# Patient Record
Sex: MALE | Race: WHITE | URBAN - NONMETROPOLITAN AREA
[De-identification: names, ages, dates, MRNs, and addresses within clinical notes are randomized per-mention and may not be internally consistent; named-entity substitution may affect disease eponyms.]

---

## 2019-08-12 ENCOUNTER — OFFICE VISIT (OUTPATIENT)
Dept: FAMILY MEDICINE | Facility: OTHER | Age: 19
End: 2019-08-12
Attending: NURSE PRACTITIONER
Payer: OTHER MISCELLANEOUS

## 2019-08-12 VITALS
TEMPERATURE: 99.4 F | OXYGEN SATURATION: 97 % | HEART RATE: 73 BPM | HEIGHT: 66 IN | RESPIRATION RATE: 16 BRPM | SYSTOLIC BLOOD PRESSURE: 138 MMHG | DIASTOLIC BLOOD PRESSURE: 82 MMHG | WEIGHT: 216.5 LBS | BODY MASS INDEX: 34.79 KG/M2

## 2019-08-12 DIAGNOSIS — S81.011A KNEE LACERATION, RIGHT, INITIAL ENCOUNTER: Primary | ICD-10-CM

## 2019-08-12 PROCEDURE — 90471 IMMUNIZATION ADMIN: CPT | Performed by: NURSE PRACTITIONER

## 2019-08-12 PROCEDURE — 99203 OFFICE O/P NEW LOW 30 MIN: CPT | Mod: 25 | Performed by: NURSE PRACTITIONER

## 2019-08-12 PROCEDURE — 90715 TDAP VACCINE 7 YRS/> IM: CPT | Performed by: NURSE PRACTITIONER

## 2019-08-12 PROCEDURE — 12001 RPR S/N/AX/GEN/TRNK 2.5CM/<: CPT | Performed by: NURSE PRACTITIONER

## 2019-08-12 ASSESSMENT — ENCOUNTER SYMPTOMS
CONSTITUTIONAL NEGATIVE: 1
ARTHRALGIAS: 0
WOUND: 1
NEUROLOGICAL NEGATIVE: 1
JOINT SWELLING: 0
PSYCHIATRIC NEGATIVE: 1
COLOR CHANGE: 0

## 2019-08-12 ASSESSMENT — MIFFLIN-ST. JEOR: SCORE: 1934.79

## 2019-08-12 ASSESSMENT — PAIN SCALES - GENERAL: PAINLEVEL: EXTREME PAIN (8)

## 2019-08-12 NOTE — PROGRESS NOTES
"  SUBJECTIVE:   Jose Dillon is a 19 year old male who presents to clinic today for the following health issues:    HPI  Cut to his right knee which occurred today. Was cutting cardboard at work with his utility knife and the knife slipped hitting his knee. Bleeding controlled, AFROM in the knee. Bandaged and came in for evaluation.        Past medical, surgical, family and social history are reviewed and updated as needed.  Medications are reviewed and updated as needed.      Review of Systems   Constitutional: Negative.    Musculoskeletal: Negative for arthralgias and joint swelling.   Skin: Positive for wound. Negative for color change.   Neurological: Negative.    Psychiatric/Behavioral: Negative.         OBJECTIVE:     /82 (BP Location: Right arm, Patient Position: Sitting, Cuff Size: Adult Regular)   Pulse 73   Temp 99.4  F (37.4  C) (Tympanic)   Resp 16   Ht 1.676 m (5' 6\")   Wt 98.2 kg (216 lb 8 oz)   SpO2 97%   BMI 34.94 kg/m    Body mass index is 34.94 kg/m .  Physical Exam   Constitutional: He is oriented to person, place, and time. He appears well-developed and well-nourished. No distress.   HENT:   Head: Normocephalic and atraumatic.   Right Ear: External ear normal.   Left Ear: External ear normal.   Nose: Nose normal.   Eyes: Conjunctivae are normal. Right eye exhibits no discharge. Left eye exhibits no discharge. No scleral icterus.   Neck: Normal range of motion.   Cardiovascular: Normal rate.   Pulmonary/Chest: Effort normal.   Musculoskeletal: Normal range of motion.   Neurological: He is alert and oriented to person, place, and time.   Skin: Skin is warm. He is not diaphoretic.        Psychiatric: He has a normal mood and affect. His behavior is normal. Judgment and thought content normal.   Nursing note and vitals reviewed.      [unfilled]    Procedure: Laceration repair  Date/Time: 8/12/2019 1:20 PM  Performed by: Alice Ozuna NP  Authorized by: Laith, " Alice AKHTAR NP     UNIVERSAL PROTOCOL   Site Marked: Yes  Prior Images Obtained and Reviewed:  Yes  Required items: Required blood products, implants, devices and special equipment available    Patient identity confirmed:  Verbally with patient  NA - No sedation, light sedation, or local anesthesia  Confirmation Checklist:  Relevant allergies  Time out: Immediately prior to the procedure a time out was called    Preparation: Patient was prepped and draped in usual sterile fashion       ANESTHESIA    Anesthesia: Local infiltration  Local Anesthetic:  Lidocaine 1% with epinephrine  Anesthetic Total (mL):  1.5      SEDATION    Patient Sedated: No    PROCEDURE   Patient Tolerance:  Patient tolerated the procedure well with no immediate complications  Describe Procedure: Laceration Repair  Indication: Reduce risk of infection, reduce bleeding risk.   Location: Anterior right knee, 1.5 cm in length  Verbal consent was obtained before procedure started.    PROCEDURE:  The appropriate timeout was taken. The area was prepped and draped in the usual sterile fashion.   Local anesthesia was achieved using 2 mLs of  Lidocaine 1% with epinephrine. The wound was copiously irrigated.   Using 3-0 Nylon, 4 sutures were placed. Estimated blood loss was less than 0.5 mL.   A dressing was applied to the area and anticipatory guidance, as well as standard post-procedure care, was explained.     Time of Sedation in Minutes by Physician:  0    Diagnostic Test Results:  No results found for this or any previous visit (from the past 24 hour(s)).    ASSESSMENT/PLAN:       ICD-10-CM    1. Knee laceration, right, initial encounter S81.011A       PLAN:  Patient presents after injuring his right knee while at work.  Due to risk of infection, laceration is repaired.  Wound is bandaged after repair and ace wrap was put over his knee to prevent overuse.  Discussed wound care at home.  Advised no strenuous activity, no squatting and no heavy lifting  to avoid reopening the wound.  Advised no swimming or soaking his leg. Will take him off work today. Monitor symptoms and follow-up as scheduled tomorrow for reevaluation. Advised patient he should expect to be released back to work with restrictions.  Sutures should come out in 10 to 14 days, follow-up sooner if concerns develop.  Given written epic education materials.  I explained my diagnostic considerations and recommendations to pt who voiced understanding and agreement with the treatment plan. All questions were answered. We discussed potential side effects of any prescribed or recommended therapies, as well as expectations for response to treatments.    Disclaimer:  This note consists of words and symbols derived from keyboarding, dictation, or using voice recognition software. As a result, there may be errors in the script that have gone undetected. Please consider this when interpreting information found in this note.      EVERETT Carter, NP-C  8/12/2019 at 12:52 PM  [facility]

## 2019-08-12 NOTE — PATIENT INSTRUCTIONS
No heavy lifting, no straining your knee, limited bend only - no squat motions until wound is healed.       Keep clean and dry x 24 hours, then may shower as usual. Wash gently with soap and warm water then pat dry.  Do not soak or swim - no pools, no lakes, no hot tubs, no washing dishes.  Take Tylenol per package directions as needed for pain.  Follow up tomorrow as scheduled.   Watch for signs and symptoms of infection: Increasing redness, pain, warmth, fever and follow up if any arise.    Patient Education     Extremity Laceration: Stitches, Staples, or Tape  A laceration is a cut through the skin. If it is deep, it may require stitches or staples to close so it can heal. Minor cuts may be treated with surgical tape closures, or skin glue.  X-rays may be done if something may have entered the skin through the cut. You may also need a tetanus shot if you are not up to date on this vaccine.  Home care    Follow the healthcare provider s instructions on how to care for the cut.    Wash your hands with soap and warm water before and after caring for your wound. This is to help prevent infection.    Keep the wound clean and dry. If a bandage was applied and it becomes wet or dirty, replace it. Otherwise, leave it in place for the first 24 hours, then change it once a day or as directed.    If stitches or staples were used, clean the wound daily:  ? After removing the bandage, wash the area with soap and water. Use a wet cotton swab to loosen and remove any blood or crust that forms.  ? After cleaning, keep the wound clean and dry. Talk with your healthcare provider before putting any antibiotic ointment on the wound. Reapply the bandage.    You may remove the bandage to shower as usual after the first 24 hours, but don't soak the area in water (no swimming) until the stitches or staples are removed.    Don't do activities that may reopen your wound.  Follow-up care  Follow up with your healthcare provider, or as  advised. Infection may sometimes occur even with proper treatment. Check the wound daily for the signs of infection listed below.    When to seek medical advice  Call your healthcare provider right away if any of these occur:    Wound bleeding not controlled by direct pressure    Signs of infection, including increasing pain in the wound, increasing wound redness or swelling, or pus or bad odor coming from the wound    Fever of 100.4 F (38 C) or higher, or as directed by your healthcare provider    Stitches or staples come apart or fall out or surgical tape falls off before 7 days    Wound edges reopen    Wound changes colors    Numbness occurs around the wound     Decreased movement around the injured area

## 2019-08-12 NOTE — NURSING NOTE
"Chief Complaint   Patient presents with     Work Comp     Pt states he was cutting cardboard at work when the knife slipped and he cut his right knee.       Initial /82 (BP Location: Right arm, Patient Position: Sitting, Cuff Size: Adult Regular)   Pulse 73   Temp 99.4  F (37.4  C) (Tympanic)   Resp 16   Ht 1.676 m (5' 6\")   Wt 98.2 kg (216 lb 8 oz)   SpO2 97%   BMI 34.94 kg/m   Estimated body mass index is 34.94 kg/m  as calculated from the following:    Height as of this encounter: 1.676 m (5' 6\").    Weight as of this encounter: 98.2 kg (216 lb 8 oz).  Medication Reconciliation: complete    Jhoana Capone LPN on 8/12/2019 at 12:41 PM    "

## 2019-08-13 ENCOUNTER — OFFICE VISIT (OUTPATIENT)
Dept: FAMILY MEDICINE | Facility: OTHER | Age: 19
End: 2019-08-13
Attending: CHIROPRACTOR
Payer: OTHER MISCELLANEOUS

## 2019-08-13 VITALS
RESPIRATION RATE: 16 BRPM | HEIGHT: 66 IN | TEMPERATURE: 98.3 F | HEART RATE: 68 BPM | SYSTOLIC BLOOD PRESSURE: 126 MMHG | DIASTOLIC BLOOD PRESSURE: 60 MMHG | BODY MASS INDEX: 35.2 KG/M2 | WEIGHT: 219 LBS

## 2019-08-13 DIAGNOSIS — S81.011A KNEE LACERATION, RIGHT, INITIAL ENCOUNTER: Primary | ICD-10-CM

## 2019-08-13 PROCEDURE — 99213 OFFICE O/P EST LOW 20 MIN: CPT | Performed by: CHIROPRACTOR

## 2019-08-13 ASSESSMENT — PAIN SCALES - GENERAL: PAINLEVEL: MILD PAIN (3)

## 2019-08-13 ASSESSMENT — MIFFLIN-ST. JEOR: SCORE: 1946.13

## 2019-08-13 NOTE — NURSING NOTE
Patient presenting for follow up from the [facility] for the following work comp concerns: Laceration on the right knee with 3 sutures.  DATE OF INJURY: 8/12/19.  No LMP for male patient.  Medication Reconciliation: complete     Review Of Systems  Skin: positive for as above  Eyes: negative  Ears/Nose/Throat: negative  Respiratory: No shortness of breath, dyspnea on exertion, cough, or hemoptysis  Cardiovascular: negative  Gastrointestinal: negative  Genitourinary: negative  Musculoskeletal: negative  Neurologic: negative  Psychiatric: negative  Hematologic/Lymphatic/Immunologic: negative  Endocrine: negative      Jami Harden LPN  8/13/2019 11:27 AM

## 2019-08-13 NOTE — PROGRESS NOTES
CHIEF COMPLAINT: Jose Dillon is a 19 year old  male  Chief Complaint   Patient presents with     Work Comp     laceration right knee       HISTORY OF PRESENTING INJURY     This is a follow-up to a work-related injury involving his right knee.  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  [facility] and received 3 [facility] and was taken off work for the day until today's for evaluation.  He has been keeping the injury site clean and free from debris given that his job is dirty.  Additionally, he has been keeping his knee wrapped and applying gauze at the suture site.  He has noticed no abnormal findings or signs of infection.  He feels well today and has no pain.      PAST MEDICAL HISTORY:  History reviewed. No pertinent past medical history.    PAST SURGICAL HISTORY:  History reviewed. No pertinent surgical history.    ALLERGIES:  Allergies   Allergen Reactions     Cats Itching and Swelling       CURRENT MEDICATIONS:  No current outpatient medications on file.       SOCIAL HISTORY:  Social History     Socioeconomic History     Marital status: Single     Spouse name: Not on file     Number of children: Not on file     Years of education: Not on file     Highest education level: Not on file   Occupational History     Not on file   Social Needs     Financial resource strain: Not on file     Food insecurity:     Worry: Not on file     Inability: Not on file     Transportation needs:     Medical: Not on file     Non-medical: Not on file   Tobacco Use     Smoking status: Never Smoker     Smokeless tobacco: Current User     Types: Chew   Substance and Sexual Activity     Alcohol use: Not Currently     Comment: Rare     Drug use: Never     Sexual activity: Yes     Partners: Female   Lifestyle     Physical activity:     Days per week: Not on file     Minutes per session: Not on  "file     Stress: Not on file   Relationships     Social connections:     Talks on phone: Not on file     Gets together: Not on file     Attends Congregation service: Not on file     Active member of club or organization: Not on file     Attends meetings of clubs or organizations: Not on file     Relationship status: Not on file     Intimate partner violence:     Fear of current or ex partner: Not on file     Emotionally abused: Not on file     Physically abused: Not on file     Forced sexual activity: Not on file   Other Topics Concern     Not on file   Social History Narrative     Not on file       FAMILY HISTORY:  History reviewed. No pertinent family history.    REVIEW OF SYSTEMS:    Nursing Notes:   Jami Harden LPN  8/13/2019 12:35 PM  Signed  Patient presenting for follow up from the [facility] for the following work comp concerns: Laceration on the right knee with 3 sutures.  DATE OF INJURY: 8/12/19.  No LMP for male patient.  Medication Reconciliation: complete     Review Of Systems  Skin: positive for as above  Eyes: negative  Ears/Nose/Throat: negative  Respiratory: No shortness of breath, dyspnea on exertion, cough, or hemoptysis  Cardiovascular: negative  Gastrointestinal: negative  Genitourinary: negative  Musculoskeletal: negative  Neurologic: negative  Psychiatric: negative  Hematologic/Lymphatic/Immunologic: negative  Endocrine: negative      Jami Harden LPN  8/13/2019 11:27 AM    I have reviewed the ROS with the patient.    PHYSICAL EXAM:   /60   Pulse 68   Temp 98.3  F (36.8  C) (Tympanic)   Resp 16   Ht 1.676 m (5' 6\")   Wt 99.3 kg (219 lb)   BMI 35.35 kg/m   Body mass index is 35.35 kg/m .  General Appearance: No acute distress.  I reviewed the note from [facility].  Laceration is 1.5 cm in length.  3 sutures were applied.  No purulent discharge or signs of infectious process.  Skin surrounding the injury site is WNL.      IMPRESSION/PLAN:    (1) continue to keep injury " site clean and dry.  Return in 10 to 14 days for suture removal.  Patient will be returning home to [place] and will have this procedure done by his PCP at [facility]  (2) work restrictions were given which include no kneeling or squatting.  These are in effect until 8/22/19 and will be removed or updated by his PCP at [facility]  (3) reapplied Ace bandage with new gauze.  Patient given additional gauze strips for future changes.    He had full understanding of the plan going forward and signed the workability form indicating so.  Abnormal signs and symptoms were discussed for him to watch and notify us of these should they arise prior to his suture removal date.  Greater than 50% of this 22-minute encounter was spent in counseling and coordination of care regarding the above condition.        Konrad Garcia DC  Director - Occupational Medicine Department  [address] [address]  Phone [phone]  Fax [phone]    Disclaimer:  This note consists of words and symbols derived from keyboarding, dictation, or using voice recognition software. As a result, there may be errors in the script that have gone undetected. Please consider this when interpreting information found in this note.    12:58 PM 8/13/2019

## (undated) RX ORDER — LIDOCAINE HYDROCHLORIDE AND EPINEPHRINE 10; 10 MG/ML; UG/ML
INJECTION, SOLUTION INFILTRATION; PERINEURAL
Status: DISPENSED
Start: 2019-08-12